# Patient Record
Sex: FEMALE | Race: WHITE | NOT HISPANIC OR LATINO
[De-identification: names, ages, dates, MRNs, and addresses within clinical notes are randomized per-mention and may not be internally consistent; named-entity substitution may affect disease eponyms.]

---

## 2018-12-07 PROBLEM — 39839004 DIAPHRAGMATIC HERNIA: Status: ACTIVE | Noted: 2018-11-24

## 2018-12-07 PROBLEM — 16331000: Status: ACTIVE | Noted: 2018-09-13

## 2018-12-07 PROBLEM — 307496006: Status: ACTIVE | Noted: 2018-09-13

## 2018-12-07 PROBLEM — 16932000: Status: ACTIVE | Noted: 2018-09-13

## 2018-12-07 PROBLEM — 25458004 ACUTE GASTRITIS: Status: ACTIVE | Noted: 2018-11-24

## 2020-08-13 ENCOUNTER — DASHBOARD ENCOUNTERS (OUTPATIENT)
Age: 70
End: 2020-08-13

## 2020-08-13 ENCOUNTER — OFFICE VISIT (OUTPATIENT)
Dept: URBAN - METROPOLITAN AREA CLINIC 33 | Facility: CLINIC | Age: 70
End: 2020-08-13

## 2020-08-13 VITALS
BODY MASS INDEX: 29.86 KG/M2 | WEIGHT: 197 LBS | DIASTOLIC BLOOD PRESSURE: 80 MMHG | HEIGHT: 68 IN | SYSTOLIC BLOOD PRESSURE: 130 MMHG

## 2020-08-13 PROBLEM — 305058001: Status: ACTIVE | Noted: 2020-08-13

## 2020-08-13 PROBLEM — 302914006 BARRETT'S ESOPHAGUS: Status: ACTIVE | Noted: 2018-11-07

## 2020-08-13 PROBLEM — 442076002: Status: ACTIVE | Noted: 2018-09-13

## 2020-08-13 PROBLEM — 422587007: Status: ACTIVE | Noted: 2020-08-13

## 2020-08-13 RX ORDER — CHOLECALCIFEROL (VITAMIN D3) 125 MCG
AS DIRECTED CAPSULE ORAL
Status: ACTIVE | COMMUNITY

## 2020-08-13 RX ORDER — DESIPRAMINE 25 MG/1
1 TABLET TABLET, FILM COATED ORAL ONCE A DAY
Qty: 30 | Status: DISCONTINUED | COMMUNITY

## 2020-08-13 RX ORDER — DESIPRAMINE HYDROCHLORIDE 50 MG/1
1 TABLET TABLET ORAL ONCE A DAY
Qty: 90 TABLET | Refills: 1 | Status: ACTIVE | COMMUNITY
Start: 2020-02-13

## 2020-08-13 RX ORDER — UBIDECARENONE 30 MG
AS DIRECTED CAPSULE ORAL
Status: ACTIVE | COMMUNITY

## 2020-08-13 RX ORDER — THIAMINE HCL 100 MG
1 TABLET WITH A MEAL TABLET ORAL ONCE A DAY
Qty: 30 | Status: ACTIVE | COMMUNITY

## 2020-08-13 RX ORDER — METOPROLOL TARTRATE 25 MG/1
1 TABLET WITH FOOD TABLET, FILM COATED ORAL TWICE A DAY
Qty: 60 | Status: ACTIVE | COMMUNITY

## 2020-08-13 RX ORDER — LISINOPRIL 10 MG/1
1 TABLET TABLET ORAL ONCE A DAY
Qty: 30 | Status: ACTIVE | COMMUNITY

## 2020-08-13 RX ORDER — NICOTINE POLACRILEX 2 MG
AS DIRECTED LOZENGE BUCCAL
Status: ACTIVE | COMMUNITY

## 2020-08-13 RX ORDER — ASPIRIN 325 MG/1
AS DIRECTED TABLET, FILM COATED ORAL
Status: ACTIVE | COMMUNITY

## 2020-08-13 RX ORDER — FAMOTIDINE, CALCIUM CARBONATE, AND MAGNESIUM HYDROXIDE 10; 800; 165 MG/1; MG/1; MG/1
2 TABLET AS NEEDED TABLET, CHEWABLE ORAL ONCE A DAY
OUTPATIENT

## 2020-08-13 RX ORDER — FAMOTIDINE, CALCIUM CARBONATE, AND MAGNESIUM HYDROXIDE 10; 800; 165 MG/1; MG/1; MG/1
2 TABLET AS NEEDED TABLET, CHEWABLE ORAL ONCE A DAY
Status: ACTIVE | COMMUNITY

## 2020-08-13 RX ORDER — FAMOTIDINE 40 MG/1
1 TABLET AT BEDTIME TABLET, FILM COATED ORAL ONCE A DAY
Qty: 90 TABLET | Refills: 3 | Status: ON HOLD | COMMUNITY
Start: 2020-02-13

## 2020-08-13 RX ORDER — METFORMIN HYDROCHLORIDE 500 MG/1
1 TABLET WITH A MEAL TABLET, FILM COATED ORAL ONCE A DAY
Qty: 30 | Status: ACTIVE | COMMUNITY

## 2020-08-13 RX ORDER — ROSUVASTATIN CALCIUM 40 MG/1
1 TABLET TABLET, FILM COATED ORAL ONCE A DAY
Qty: 30 | Status: ACTIVE | COMMUNITY

## 2020-08-13 NOTE — HPI-MIGRATED HPI
;   ;   ;   ;   ;     Diverticulitis :  Patient has 1-2 bowel movements a day. Stools are typically formed. Patient denies abdominal pain, melena, mucus, or blood in stool, fever, bloating, or increased gas.   Last visit (02/13/2020)               Patient denies any diverticulosis flare ups since her last office visit. Patient reports 1 bowel movement a day or every other day with no strain. She admits that her stools are formed without bleeding.    Last visit (4/8/2019) Patient was diagnosed with diverticulitis, that was found via CT Abd/Pelvis with contrast performed at [facility] (6/5/2018). Patient admits 1-2 bowel movements a day, stools are varies from form to medium soft. Patient denies melena, blood, or mucus in stool.    Last visit (12/6/2018) She admit having 1-3 bowel movements per day with normal and formed stools. Denies blood, mucous or melena in stools.  She states every so often she will have a day with frequent loose stools.  She denies attributing factors.     Last visit (11/08/2018) Patient admits to having 1-3 formed bowel movements per day. She denies blood, mucous or melena. She states every so often she will have a day with frequent loose stools. She denies attributing factors.   Last visit (9/13/2018)  Patient presents today for a consultation of diverticulitis, that was found via CT Abd/Pelvis with contrast performed at [facility] (6/5/2018).  She admit onset of symptoms Anayeli 3, 2018. Described as a sharp pain located mid-lower abdomen.  Symptoms occurred without provocation in an intermittent pattern. Subsequently treated with Cipro 500 mg BID and Flagyl 500 mg 1QD x 10 days, but d/c the Flagyl on day 9, due to having nausea and vomiting. Labs were luis fernando showing elevated WBC.  Subsequently treated with Cipro 500 mg BID x 7 days. Abdominal pain resolved during course of Cipro without reoccurrence.   She continue to admit having nausea and vomiting, but not as frequent.  Now occurring twice a week.   She admit having prior history of Diverticulitis years ago.    ;   Early Satiety : Denies episodes of early satiety with taking Despiramine 50 mg.   Last visit (02/13/2020)               Patient presents today for a follow up of early satiety since her last office visit. She admits that she will experience occasional episodes of early satiety.    Last visit (4/8/2019) Patient presents today for follow up of early satiety. Patient continues to admit improvement with consuming smaller portions.  Last visit (12/6/2018) Patient presents today to review Gastric Emptying Study and follow up of early satiety.  She continues to admit early satiety.  She has been consuming smaller portions with minimal benefit.      Last visit (11/08/2018) Patient admits to having intermittent episodes of early satiety. She continues to decrease her portion size when consuming a meal.   Last visit (9/13/2018) Admit early satiety associated with heartburn symptoms. ;   Nausea/Vomiting : Admits nausea and vomiting couple of times last month, probably due to work related stress. Denies vomiting since 1.5 week ago. She still admits nausea associated with empty stomach. Some times, food intake helps with nausea.   Last visit (02/13/2020)               Patient denies any episodes of nausea or vomiting since her last office visit.    Last visit (4/8/2019) Patient admits 6 episodes of nausea and vomiting since her last ovist. Patient last episode was 10 days ago. Patient continues to take Zofran as needed she has started Desipramine 25 MG, 1 QD. Patient denies holding the Metformin after or discussing it with her PCP. If N/V persists then she can start the Desipramine.     Last visit (12/6/2018) Admit episodes of nausea and vomiting that will last for 5-7 days, symptoms will stop for maybe 8 days then return without provocation.  Admit experiencing dry heaves yesterday.  Today she had an episode of vomiting at which time she took Zofran with relief an hour later. These episodes occur more frequently in the later part of the day    Last visit (11/08/2018) Patient presents today for a follow up of nausea/vomiting and to review her EGD results.  She denies any complications after procedure. Patient continues to complain of nausea and vomiting. She admits to having two weeks were she will experience symptoms daily, alternating with two weeks were she has very mild episodes.  Its worse  in the early morning   Last visit (9/13/2018) Admit nausea and vomiting with onset day 9 of a 10 day course of Cipro and Flagyl for diverticulitis.  Symptoms initially occurred daily.  Labs were luis fernando at follow up with PCP June 13, 2018 showing elevated WBC.  Subsequently treated with another dose of Cipro 500 mg BID x 7 days and Zofran 4 mg. Zofran has been helpful for nausea and vomiting.  She continue to admit having nausea and occasionally episodes of vomiting, but not as frequent since over the past 3 weeks.  Now occurring twice a week, but will persist throughout that day.  ;   Jung's Esophagus : Patient is a 69-year-old female, who presents today to follow up for Anthony's Esophagus. Admits symptoms are controlled with the use of Famotidine Tablet, 40 MG since her last office visit. She was not able to  Famotidine, instead she has been taking Pepcid with relief.   Last visit (02/13/2020)                Patient admits symptoms are controlled with the use of Pepcid OTC since her last office visit.    Last visit (4/8/2019) Patient was found to have Jung's Esophagus via EGD on 10/24/2018. Patient currently takes Ranitidine 150 mg 2 tabs po qhs, she admits intermittent episodes of heartburn occurring once every 2-3 weeks and taking Tums to alleviate her symptoms.   Last visit (12/6/2018) Patient was found to have Jung's Esophagus via EGD on 10/24/2018. Patient currently takes Ranitidine 150 mg 2 tabs po qhs. ;   Heartburn : Denies episodes of heartburn since her last office visit. Admits relief with Famotidine or Pepcid.   Last visit (02/13/2020)               Patient denies episodes of heartburn since her last office visit. She reports that her symptoms are controlled with the use of Pepcid OTC.   Last visit (4/8/2019) Admit control of symptoms with the use of Ranitidine 150 mg. She admits intermittent episodes of heartburn occurring once every 2-3 weeks and taking Tums to alleviate her symptoms.       Last visit (12/6/2018) Admit control of symptoms with the use of Ranitidine 150 mg. Occasionally will experience symptoms in the late afternoon and will take a Tums with relief.      Last visit (11/08/2018) Patient denies recent episodes of heartburn symptoms. She takes Ranitidine 150 mg 2 tabs po qhs. Patient does admit very mild breakthrough episodes occurring intermittently.  Last visit (9/13/2018) Patient admit heartburn symptoms over the past few years. Described as regurgitation and vomiting.  Symptoms wake her from sleep around 2-3 am.  She has been taking OTC Zantac 150 mg 1-2 QD with some relief.  ;

## 2020-09-03 ENCOUNTER — OFFICE VISIT (OUTPATIENT)
Dept: URBAN - METROPOLITAN AREA CLINIC 35 | Facility: CLINIC | Age: 70
End: 2020-09-03

## 2020-09-04 ENCOUNTER — TELEPHONE ENCOUNTER (OUTPATIENT)
Dept: URBAN - METROPOLITAN AREA CLINIC 35 | Facility: CLINIC | Age: 70
End: 2020-09-04

## 2020-09-09 ENCOUNTER — OFFICE VISIT (OUTPATIENT)
Dept: URBAN - METROPOLITAN AREA SURGERY CENTER 8 | Facility: SURGERY CENTER | Age: 70
End: 2020-09-09

## 2020-09-11 ENCOUNTER — TELEPHONE ENCOUNTER (OUTPATIENT)
Dept: URBAN - METROPOLITAN AREA CLINIC 35 | Facility: CLINIC | Age: 70
End: 2020-09-11

## 2020-09-11 RX ORDER — DESIPRAMINE HYDROCHLORIDE 50 MG/1
1 TABLET TABLET ORAL ONCE A DAY
Qty: 90 TABLET | Refills: 1 | OUTPATIENT
Start: 2020-02-13

## 2020-09-18 ENCOUNTER — TELEPHONE ENCOUNTER (OUTPATIENT)
Dept: URBAN - METROPOLITAN AREA CLINIC 35 | Facility: CLINIC | Age: 70
End: 2020-09-18

## 2020-09-24 ENCOUNTER — OFFICE VISIT (OUTPATIENT)
Dept: URBAN - METROPOLITAN AREA CLINIC 33 | Facility: CLINIC | Age: 70
End: 2020-09-24

## 2020-09-24 NOTE — HPI-MIGRATED HPI
;   ;   ;   ;   ;     Diverticulitis : Patient admits/denies any associated symptoms at this time. Currently reports ---- bowel movements ---. Her stools are --- with/without blood, mucus, or melena present.   Last visit (8/13/2020) Patient has 1-2 bowel movements a day. Stools are typically formed. Patient denies abdominal pain, melena, mucus, or blood in stool, fever, bloating, or increased gas.   Last visit (02/13/2020)Patient denies any diverticulosis flare ups since her last office visit. Patient reports 1 bowel movement a day or every other day with no strain. She admits that her stools are formed without bleeding.    Last visit (4/8/2019) Patient was diagnosed with diverticulitis, that was found via CT Abd/Pelvis with contrast performed at [facility] (6/5/2018). Patient admits 1-2 bowel movements a day, stools are varies from form to medium soft. Patient denies melena, blood, or mucus in stool.    Last visit (12/6/2018) She admit having 1-3 bowel movements per day with normal and formed stools. Denies blood, mucous or melena in stools.  She states every so often she will have a day with frequent loose stools.  She denies attributing factors.     Last visit (11/08/2018) Patient admits to having 1-3 formed bowel movements per day. She denies blood, mucous or melena. She states every so often she will have a day with frequent loose stools. She denies attributing factors.   Last visit (9/13/2018)  Patient presents today for a consultation of diverticulitis, that was found via CT Abd/Pelvis with contrast performed at [facility] (6/5/2018).  She admit onset of symptoms Anayeli 3, 2018. Described as a sharp pain located mid-lower abdomen.  Symptoms occurred without provocation in an intermittent pattern. Subsequently treated with Cipro 500 mg BID and Flagyl 500 mg 1QD x 10 days, but d/c the Flagyl on day 9, due to having nausea and vomiting. Labs were luis fernando showing elevated WBC.  Subsequently treated with Cipro 500 mg BID x 7 days. Abdominal pain resolved during course of Cipro without reoccurrence.   She continue to admit having nausea and vomiting, but not as frequent.  Now occurring twice a week.   She admit having prior history of Diverticulitis years ago.    ;   Early Satiety : Patient admits/denies continued episodes of early satiety since her last visit.    Last visit (8/13/2020) Denies episodes of early satiety with taking Despiramine 50 mg.   Last visit (02/13/2020)               Patient presents today for a follow up of early satiety since her last office visit. She admits that she will experience occasional episodes of early satiety.    Last visit (4/8/2019) Patient presents today for follow up of early satiety. Patient continues to admit improvement with consuming smaller portions.  Last visit (12/6/2018) Patient presents today to review Gastric Emptying Study and follow up of early satiety.  She continues to admit early satiety.  She has been consuming smaller portions with minimal benefit.      Last visit (11/08/2018) Patient admits to having intermittent episodes of early satiety. She continues to decrease her portion size when consuming a meal.   Last visit (9/13/2018) Admit early satiety associated with heartburn symptoms. ;   Nausea/Vomiting : Patient admits/denies improvement in her episodes of nausea/vomiting since her last visit.   Last visit (8/13/2020) Admits nausea and vomiting couple of times last month, probably due to work related stress. Denies vomiting since 1.5 week ago. She still admits nausea associated with empty stomach. Some times, food intake helps with nausea.   Last visit (02/13/2020)               Patient denies any episodes of nausea or vomiting since her last office visit.    Last visit (4/8/2019) Patient admits 6 episodes of nausea and vomiting since her last ovist. Patient last episode was 10 days ago. Patient continues to take Zofran as needed she has started Desipramine 25 MG, 1 QD. Patient denies holding the Metformin after or discussing it with her PCP. If N/V persists then she can start the Desipramine.     Last visit (12/6/2018) Admit episodes of nausea and vomiting that will last for 5-7 days, symptoms will stop for maybe 8 days then return without provocation.  Admit experiencing dry heaves yesterday.  Today she had an episode of vomiting at which time she took Zofran with relief an hour later. These episodes occur more frequently in the later part of the day    Last visit (11/08/2018) Patient presents today for a follow up of nausea/vomiting and to review her EGD results.  She denies any complications after procedure. Patient continues to complain of nausea and vomiting. She admits to having two weeks were she will experience symptoms daily, alternating with two weeks were she has very mild episodes.  Its worse  in the early morning   Last visit (9/13/2018) Admit nausea and vomiting with onset day 9 of a 10 day course of Cipro and Flagyl for diverticulitis.  Symptoms initially occurred daily.  Labs were luis fernando at follow up with PCP June 13, 2018 showing elevated WBC.  Subsequently treated with another dose of Cipro 500 mg BID x 7 days and Zofran 4 mg. Zofran has been helpful for nausea and vomiting.  She continue to admit having nausea and occasionally episodes of vomiting, but not as frequent since over the past 3 weeks.  Now occurring twice a week, but will persist throughout that day.  ;   Jung's Esophagus : A 69 y/o female patient presents today for a follow of Jung's Esophagus. She admits/denies _ __ _ are associated symptoms at this time but are controlled with the use of _ _-   ? Continue Pepcid Complete Tablet Chewable, -165 MG  Last visit (8/13/2020) Patient is a 69-year-old female, who presents today to follow up for Anthony's Esophagus. Admits symptoms are controlled with the use of Famotidine Tablet, 40 MG since her last office visit. She was not able to  Famotidine, instead she has been taking Pepcid with relief.   Last visit (02/13/2020)                Patient admits symptoms are controlled with the use of Pepcid OTC since her last office visit.    Last visit (4/8/2019) Patient was found to have Jung's Esophagus via EGD on 10/24/2018. Patient currently takes Ranitidine 150 mg 2 tabs po qhs, she admits intermittent episodes of heartburn occurring once every 2-3 weeks and taking Tums to alleviate her symptoms.   Last visit (12/6/2018) Patient was found to have Jung's Esophagus via EGD on 10/24/2018. Patient currently takes Ranitidine 150 mg 2 tabs po qhs. ;   Heartburn : She admits/denies symptoms are controlled with the use of ----.    Last visit (8/13/2020) Denies episodes of heartburn since her last office visit. Admits relief with Famotidine or Pepcid.   Last visit (02/13/2020)               Patient denies episodes of heartburn since her last office visit. She reports that her symptoms are controlled with the use of Pepcid OTC.   Last visit (4/8/2019) Admit control of symptoms with the use of Ranitidine 150 mg. She admits intermittent episodes of heartburn occurring once every 2-3 weeks and taking Tums to alleviate her symptoms.       Last visit (12/6/2018) Admit control of symptoms with the use of Ranitidine 150 mg. Occasionally will experience symptoms in the late afternoon and will take a Tums with relief.      Last visit (11/08/2018) Patient denies recent episodes of heartburn symptoms. She takes Ranitidine 150 mg 2 tabs po qhs. Patient does admit very mild breakthrough episodes occurring intermittently.  Last visit (9/13/2018) Patient admit heartburn symptoms over the past few years. Described as regurgitation and vomiting.  Symptoms wake her from sleep around 2-3 am.  She has been taking OTC Zantac 150 mg 1-2 QD with some relief.  ;